# Patient Record
Sex: MALE | Race: BLACK OR AFRICAN AMERICAN
[De-identification: names, ages, dates, MRNs, and addresses within clinical notes are randomized per-mention and may not be internally consistent; named-entity substitution may affect disease eponyms.]

---

## 2019-01-10 ENCOUNTER — HOSPITAL ENCOUNTER (EMERGENCY)
Dept: HOSPITAL 87 - ER | Age: 54
LOS: 1 days | Discharge: HOME | End: 2019-01-11
Payer: MEDICARE

## 2019-01-10 VITALS — WEIGHT: 163.14 LBS | HEIGHT: 67 IN | BODY MASS INDEX: 25.61 KG/M2

## 2019-01-10 DIAGNOSIS — R44.0: ICD-10-CM

## 2019-01-10 DIAGNOSIS — R45.851: ICD-10-CM

## 2019-01-10 DIAGNOSIS — R07.89: ICD-10-CM

## 2019-01-10 DIAGNOSIS — F20.0: Primary | ICD-10-CM

## 2019-01-10 DIAGNOSIS — F31.9: ICD-10-CM

## 2019-01-10 LAB
AMPHETAMINES UR QL SCN: (no result)
APPEARANCE UR: CLEAR
BARBITURATES UR QL SCN: NEGATIVE
BASOPHILS NFR BLD AUTO: 0.4 % (ref 0–2)
BENZODIAZ UR QL SCN: NEGATIVE
BZE UR QL SCN: NEGATIVE
CANNABINOIDS UR QL SCN: NEGATIVE
CHLORIDE SERPL-SCNC: 100 MEQ/L (ref 98–107)
COLOR UR: YELLOW
EOSINOPHIL NFR BLD AUTO: 2.4 % (ref 0–5)
ERYTHROCYTE [DISTWIDTH] IN BLOOD BY AUTOMATED COUNT: 14.7 % (ref 11.6–14.6)
ETHANOL SERPL-MCNC: < 10 MG/DL
HCT VFR BLD AUTO: 45 % (ref 42–52)
HGB BLD-MCNC: 15.4 G/DL (ref 14–18)
HGB UR QL STRIP: NEGATIVE
KETONES UR STRIP-MCNC: (no result) MG/DL
LEUKOCYTE ESTERASE UR QL STRIP: NEGATIVE
LYMPHOCYTES NFR BLD AUTO: 26.2 % (ref 20–50)
MCH RBC QN AUTO: 30.5 PG (ref 28–32)
MCV RBC AUTO: 88.8 FL (ref 80–94)
METHADONE UR QL SCN: NEGATIVE
MONOCYTES NFR BLD AUTO: 9.5 % (ref 2–8)
NEUTROPHILS NFR BLD AUTO: 61.5 % (ref 40–76)
NITRITE UR QL STRIP: NEGATIVE
OPIATES UR QL SCN: NEGATIVE
PCP UR QL SCN: NEGATIVE
PH UR STRIP: 6 [PH] (ref 4.5–8)
PLATELET # BLD AUTO: 248 X1000/UL (ref 130–400)
PMV BLD AUTO: 8.6 FL (ref 7.4–10.4)
PROT UR QL STRIP: NEGATIVE
RBC # BLD AUTO: 5.06 MILL/UL (ref 4.7–6.1)
SP GR UR STRIP: 1.02 (ref 1–1.03)
UROBILINOGEN UR STRIP-MCNC: 2 E.U./DL (ref 0.2–1)

## 2019-01-10 PROCEDURE — 80305 DRUG TEST PRSMV DIR OPT OBS: CPT

## 2019-01-10 PROCEDURE — 80048 BASIC METABOLIC PNL TOTAL CA: CPT

## 2019-01-10 PROCEDURE — 84484 ASSAY OF TROPONIN QUANT: CPT

## 2019-01-10 PROCEDURE — 99284 EMERGENCY DEPT VISIT MOD MDM: CPT

## 2019-01-10 PROCEDURE — 85025 COMPLETE CBC W/AUTO DIFF WBC: CPT

## 2019-01-10 PROCEDURE — 36415 COLL VENOUS BLD VENIPUNCTURE: CPT

## 2019-01-10 PROCEDURE — 81003 URINALYSIS AUTO W/O SCOPE: CPT

## 2019-01-10 PROCEDURE — 80307 DRUG TEST PRSMV CHEM ANLYZR: CPT

## 2019-01-10 PROCEDURE — 93005 ELECTROCARDIOGRAM TRACING: CPT

## 2019-01-10 PROCEDURE — 80329 ANALGESICS NON-OPIOID 1 OR 2: CPT

## 2019-01-11 VITALS — SYSTOLIC BLOOD PRESSURE: 126 MMHG | DIASTOLIC BLOOD PRESSURE: 66 MMHG

## 2019-07-15 ENCOUNTER — HOSPITAL ENCOUNTER (INPATIENT)
Dept: HOSPITAL 54 - ER | Age: 54
LOS: 7 days | Discharge: NURSING, REHAB OR LONG-TERM CARE | DRG: 885 | End: 2019-07-22
Attending: INTERNAL MEDICINE | Admitting: PSYCHIATRY & NEUROLOGY
Payer: MEDICARE

## 2019-07-15 VITALS — BODY MASS INDEX: 20.53 KG/M2 | WEIGHT: 160 LBS | HEIGHT: 74 IN

## 2019-07-15 DIAGNOSIS — Z73.6: ICD-10-CM

## 2019-07-15 DIAGNOSIS — F15.10: ICD-10-CM

## 2019-07-15 DIAGNOSIS — Z79.899: ICD-10-CM

## 2019-07-15 DIAGNOSIS — F32.9: ICD-10-CM

## 2019-07-15 DIAGNOSIS — F41.9: ICD-10-CM

## 2019-07-15 DIAGNOSIS — K59.00: ICD-10-CM

## 2019-07-15 DIAGNOSIS — F20.0: Primary | ICD-10-CM

## 2019-07-15 DIAGNOSIS — F29: ICD-10-CM

## 2019-07-15 DIAGNOSIS — F19.90: ICD-10-CM

## 2019-07-15 LAB
ALBUMIN SERPL BCP-MCNC: 3.7 G/DL (ref 3.4–5)
ALP SERPL-CCNC: 53 U/L (ref 46–116)
ALT SERPL W P-5'-P-CCNC: 23 U/L (ref 12–78)
APAP SERPL-MCNC: 0 UG/ML (ref 10–30)
AST SERPL W P-5'-P-CCNC: 18 U/L (ref 15–37)
BASOPHILS # BLD AUTO: 0 /CMM (ref 0–0.2)
BASOPHILS NFR BLD AUTO: 0.6 % (ref 0–2)
BILIRUB DIRECT SERPL-MCNC: 0.1 MG/DL (ref 0–0.2)
BILIRUB SERPL-MCNC: 0.5 MG/DL (ref 0.2–1)
BUN SERPL-MCNC: 12 MG/DL (ref 7–18)
CALCIUM SERPL-MCNC: 8.8 MG/DL (ref 8.5–10.1)
CHLORIDE SERPL-SCNC: 104 MMOL/L (ref 98–107)
CO2 SERPL-SCNC: 27 MMOL/L (ref 21–32)
CREAT SERPL-MCNC: 1.1 MG/DL (ref 0.6–1.3)
EOSINOPHIL NFR BLD AUTO: 2 % (ref 0–6)
ETHANOL SERPL-MCNC: < 3 MG/DL (ref 0–0)
GLUCOSE SERPL-MCNC: 99 MG/DL (ref 74–106)
HCT VFR BLD AUTO: 48 % (ref 39–51)
HGB BLD-MCNC: 16.2 G/DL (ref 13.5–17.5)
LYMPHOCYTES NFR BLD AUTO: 1.7 /CMM (ref 0.8–4.8)
LYMPHOCYTES NFR BLD AUTO: 30.9 % (ref 20–44)
MCHC RBC AUTO-ENTMCNC: 34 G/DL (ref 31–36)
MCV RBC AUTO: 90 FL (ref 80–96)
MONOCYTES NFR BLD AUTO: 0.5 /CMM (ref 0.1–1.3)
MONOCYTES NFR BLD AUTO: 8.8 % (ref 2–12)
NEUTROPHILS # BLD AUTO: 3.2 /CMM (ref 1.8–8.9)
NEUTROPHILS NFR BLD AUTO: 57.7 % (ref 43–81)
PH UR STRIP: 6 [PH] (ref 5–8)
PLATELET # BLD AUTO: 252 /CMM (ref 150–450)
POTASSIUM SERPL-SCNC: 4.2 MMOL/L (ref 3.5–5.1)
PROT SERPL-MCNC: 7.4 G/DL (ref 6.4–8.2)
PROT UR QL STRIP: 30 MG/DL
RBC # BLD AUTO: 5.33 MIL/UL (ref 4.5–6)
RBC #/AREA URNS HPF: (no result) /HPF (ref 0–2)
SALICYLATES SERPL-MCNC: 2.2 MG/DL (ref 2.8–20)
SODIUM SERPL-SCNC: 139 MMOL/L (ref 136–145)
UROBILINOGEN UR STRIP-MCNC: 1 EU/DL
WBC #/AREA URNS HPF: (no result) /HPF (ref 0–3)
WBC NRBC COR # BLD AUTO: 5.5 K/UL (ref 4.3–11)

## 2019-07-15 PROCEDURE — G0480 DRUG TEST DEF 1-7 CLASSES: HCPCS

## 2019-07-15 RX ADMIN — LORAZEPAM PRN MG: 0.5 TABLET ORAL at 17:27

## 2019-07-15 RX ADMIN — TEMAZEPAM PRN MG: 7.5 CAPSULE ORAL at 21:40

## 2019-07-15 NOTE — NUR
RN-CO: UPON FACE TO FACE EVALUATION, PATIENT NOTED TO BE UNPREDICTABLE, ANGRY, 

UNCOOPERATIVE AND SUSPICIOUS TO STAFF. HIS SPEECH IS PRESSURED, HE IS 

MALODOROUS AND DISHEVELED.PT ALSO NOTED TO HAVE DISORGANIZED THOUGHTS.HE ALSO STATED THAT HE 
HAS AUDITORY HALLUCINATIONS. DR HOOKER IS AWARE OF THE ADMISSION AND GAVE HER ADMITTING 
ORDERS. ALL BELONGINGS WERE SCREENED OF CONTRABAND, VALUABLES WERE TAKEN TO SAFE. PATIENT'S 
RIGHTS WERE DISCUSSED AND BOOKLET WAS GIVEN. HE REFUSED SKIN ASSESSMENT AND NOTIFICATION OF 
RESPONSIBLE PARTY.

## 2019-07-15 NOTE — NUR
GPS RN NOTE, RECEIVED PATIENT AWAKE AND IN BED, NO S/S OR COMPLAINTS OF PAIN AT THIS TIME.  
PATIENT IS DISPLAYING NO S/S OF APPARENT DISTRESS AT THIS TIME.  PATIENT BREATHING IS 
UNLABORED WITH EQUAL RISE AND FALL OF THE CHEST.  PATIENT IS ALERT AND ORIENTED X 3 ON ROOM 
AIR WITH A SPO2 OF 99 %.  PATIENT IS MED COMPLAINT, DISORGANIZED, ANXIOUS, HYPERVERBAL, 
ANGRY, COOPERATIVE, AND NEEDS REORIENTATION.  PATIENT DENIES SUICIDE AND HOMICIDAL IDEATIONS 
AT THIS TIME.  PATIENT ASSISTED WITH TURNING AND REPOSITIONING Q2HR AND PRN FOR COMFORT AND 
CIRCULATION.  PATIENT HAS NO NEEDS AT THIS TIME.  PATIENT EDUCATED ON THE USE OF THE CALL 
BELL.  PATIENT BED SIDE RAILS ARE UP X 2 FOR SAFETY, BED IS LOCKED, AND LOW WILL CONTINUE TO 
MONITOR AND MAINTAIN SAFETY.

## 2019-07-15 NOTE — NUR
GPS RN NOTE, PATIENT HAS A COMPLAINT OF NOT BEING ABLE TO SLEEP AND IS REQUESTING RESTORIL 
AT THIS TIME.  PATIENT VITAL SIGNS ARE STABLE.  GAVE RESTORIL 7.5 MG PO HS PRN AS ORDERED.  
WILL REASSESS FOR INSOMNIA AND I WILL CONTINUE TO MONITOR THIS PATIENT.

## 2019-07-15 NOTE — NUR
GPS RN NOTE, PATIENT HAD A WITNESSED FALL IN HALLWAY NEAR NURSING STATION.  PATIENT FELL ON 
HIS LEFT HIP AND DID NOT HIT HIS HEAD AND HAD NO LOSS OF CONSCIOUSNESS.  PATIENT ASSISTED UP 
WITH THE HELP OF TWO STAFF MEMBERS.  PATIENT ABLE TO AMBULATE BACK TO BED IN ROOM 216-1 BUT 
HAS A COMPLAINT OF LEFT HIP PAIN 8 OUT OF 10 ON THE PAIN SCALE.  PATIENT VITAL SIGNS ARE AS 
FOLLOWS B//79, PULSE 72, TEMP 98.3, RESPIRATIONS 18, SPO2 99% ON ROOM AIR.  PAGED [facility] 
[employer] AND INFORMED DR LAUREN ZEE OF MY FINDINGS.   DR LAUREN ZEE ORDER 
TO DO LEFT HIP X-RAY 2 VIEWS STAT AND TO GIVE NORCO 5-325 1 TAB PO Q6 HR PRN.  HOUSE 
SUPERVISOR MADE AWARE OF FALL.  PATIENT HAS NO FAMILY TO NOTIFY OF FALL.  ALL ORDERS NOTED 
AND CARRIED OUT.  WILL CONTINUE TO MONITOR THIS PATIENT.

## 2019-07-15 NOTE — NUR
PATIENT REMAINS STABLE IN BED WATCHING T.V WITH NO DISTRESS NOTED. LUNCH WAS 
PROVIDED FOR PATIENT. WILL CONTINUE TO [facility]. AWAITING TO BE SEEN BY ART FOR 
PSYCH EVAL

## 2019-07-15 NOTE — NUR
53 YEAR OLD MALE BIBA [facility]  [address] from [facility] psych 
evaluation "showed agression this am during breakfast to another client". ALERT 
AND ORIENTED X 2-3 BREATHING EVEN AND UNLABORED WITH NO DISTRESS NOTED. SKIN 
WARM TO TOUCH AND INTACT. AWAITNG TO BE SEEN BY MD

## 2019-07-16 VITALS — SYSTOLIC BLOOD PRESSURE: 126 MMHG | DIASTOLIC BLOOD PRESSURE: 76 MMHG

## 2019-07-16 VITALS — DIASTOLIC BLOOD PRESSURE: 65 MMHG | SYSTOLIC BLOOD PRESSURE: 115 MMHG

## 2019-07-16 LAB
ALBUMIN SERPL BCP-MCNC: 3.4 G/DL (ref 3.4–5)
ALP SERPL-CCNC: 42 U/L (ref 46–116)
ALT SERPL W P-5'-P-CCNC: 22 U/L (ref 12–78)
AST SERPL W P-5'-P-CCNC: 16 U/L (ref 15–37)
BILIRUB SERPL-MCNC: 0.6 MG/DL (ref 0.2–1)
BUN SERPL-MCNC: 14 MG/DL (ref 7–18)
CALCIUM SERPL-MCNC: 8.7 MG/DL (ref 8.5–10.1)
CHLORIDE SERPL-SCNC: 104 MMOL/L (ref 98–107)
CHOLEST SERPL-MCNC: 115 MG/DL (ref ?–200)
CO2 SERPL-SCNC: 27 MMOL/L (ref 21–32)
CREAT SERPL-MCNC: 1 MG/DL (ref 0.6–1.3)
GLUCOSE SERPL-MCNC: 82 MG/DL (ref 74–106)
HDLC SERPL-MCNC: 36 MG/DL (ref 40–60)
LDLC SERPL DIRECT ASSAY-MCNC: 66 MG/DL (ref 0–99)
POTASSIUM SERPL-SCNC: 3.9 MMOL/L (ref 3.5–5.1)
PROT SERPL-MCNC: 6.9 G/DL (ref 6.4–8.2)
SODIUM SERPL-SCNC: 138 MMOL/L (ref 136–145)
TRIGL SERPL-MCNC: 69 MG/DL (ref 30–150)

## 2019-07-16 RX ADMIN — Medication SCH MG: at 17:08

## 2019-07-16 RX ADMIN — TEMAZEPAM PRN MG: 7.5 CAPSULE ORAL at 22:03

## 2019-07-16 RX ADMIN — LORAZEPAM PRN MG: 0.5 TABLET ORAL at 08:38

## 2019-07-16 NOTE — NUR
GPS RN NOTE, DR LAUREN ZEE MADE AWARE OF PATIENT LEFT HIP X-RAY RESULTS. WILL 
CONTINUE TO MONITOR THIS PATIENT.

## 2019-07-17 VITALS — DIASTOLIC BLOOD PRESSURE: 76 MMHG | SYSTOLIC BLOOD PRESSURE: 128 MMHG

## 2019-07-17 VITALS — SYSTOLIC BLOOD PRESSURE: 117 MMHG | DIASTOLIC BLOOD PRESSURE: 67 MMHG

## 2019-07-17 VITALS — DIASTOLIC BLOOD PRESSURE: 59 MMHG | SYSTOLIC BLOOD PRESSURE: 105 MMHG

## 2019-07-17 RX ADMIN — LORAZEPAM PRN MG: 0.5 TABLET ORAL at 08:25

## 2019-07-17 RX ADMIN — TEMAZEPAM PRN MG: 7.5 CAPSULE ORAL at 22:13

## 2019-07-17 RX ADMIN — BENZTROPINE MESYLATE SCH MG: 1 TABLET ORAL at 16:51

## 2019-07-17 RX ADMIN — Medication SCH MG: at 16:50

## 2019-07-17 RX ADMIN — QUETIAPINE SCH MG: 25 TABLET, FILM COATED ORAL at 16:51

## 2019-07-17 RX ADMIN — Medication SCH MG: at 08:25

## 2019-07-17 NOTE — NUR
NURSE NOTE

PT IS VERY SEXUAL FOCUSED OPENING UP HIS HOSPITAL GROWN, PT INSTRUCTED TO COVER HIS CLOTHS, 
AND INSTRUCTED NOT TO EXPOSED HIS HIM SELF AGAIN. PT TOOK HIS MEDICATION AND STARTED 
COMPLAINING OF HAVING SIDE EFFECT OF THE MEDICATION, DR WERE CALLED AND SHE GAVE ORDER FOR 
BENADYL. IM SHOT WERE GIVEN.

## 2019-07-17 NOTE — NUR
Initial Discharge Plan: Pt currently resides at [facility] located at [address] [place] [address]; ([phone]). Per pt, he would like to return 
to the facility. SW will work with the MD and the pt regarding appropriate discharge 
planning. SW will form a safe and proper discharge.

## 2019-07-17 NOTE — NUR
NURSE NOTE 

DR CHANGE PT MEDICATION RISPERDAL TO SEROQUEL, PT STILL FOCUSED ON THE RISPERDAL ASKING WHY 
DR GIVE HIM  RISPERDAL IN FIRST PLACE, PT ASKING REPEATED QUESTIONS OVER AND OVER, PT 
INSTRUCTED TO WRITE DOWN ALL HIS QUESTIONS AND ASKED DR TOMORROW.

## 2019-07-18 VITALS — SYSTOLIC BLOOD PRESSURE: 110 MMHG | DIASTOLIC BLOOD PRESSURE: 66 MMHG

## 2019-07-18 VITALS — SYSTOLIC BLOOD PRESSURE: 100 MMHG | DIASTOLIC BLOOD PRESSURE: 59 MMHG

## 2019-07-18 VITALS — DIASTOLIC BLOOD PRESSURE: 69 MMHG | SYSTOLIC BLOOD PRESSURE: 106 MMHG

## 2019-07-18 RX ADMIN — MUPIROCIN SCH GM: 20 OINTMENT TOPICAL at 21:07

## 2019-07-18 RX ADMIN — LORAZEPAM PRN MG: 0.5 TABLET ORAL at 08:43

## 2019-07-18 RX ADMIN — QUETIAPINE SCH MG: 25 TABLET, FILM COATED ORAL at 08:43

## 2019-07-18 RX ADMIN — QUETIAPINE SCH MG: 25 TABLET, FILM COATED ORAL at 17:27

## 2019-07-18 RX ADMIN — Medication SCH MG: at 17:27

## 2019-07-18 RX ADMIN — BENZTROPINE MESYLATE SCH MG: 1 TABLET ORAL at 08:43

## 2019-07-18 RX ADMIN — TEMAZEPAM PRN MG: 7.5 CAPSULE ORAL at 21:07

## 2019-07-18 RX ADMIN — BENZTROPINE MESYLATE SCH MG: 1 TABLET ORAL at 17:27

## 2019-07-18 RX ADMIN — Medication SCH MG: at 08:43

## 2019-07-18 NOTE — NUR
NURSE NOTE 

LAB CALLED THAT PT HAS MRSA OF NARES, PT ROOM CHARGE FROM 216B , PT INSTRUCTED TO STAY 
IN HIS ROOM BECAUESE OF THE ISOLATION

## 2019-07-18 NOTE — NUR
Group Note: Pt refused to attend group on 7/18/19 at 2pm and stated that he would much 
rather "roam the halls."

## 2019-07-19 VITALS — SYSTOLIC BLOOD PRESSURE: 112 MMHG | DIASTOLIC BLOOD PRESSURE: 75 MMHG

## 2019-07-19 VITALS — SYSTOLIC BLOOD PRESSURE: 145 MMHG | DIASTOLIC BLOOD PRESSURE: 94 MMHG

## 2019-07-19 VITALS — DIASTOLIC BLOOD PRESSURE: 79 MMHG | SYSTOLIC BLOOD PRESSURE: 125 MMHG

## 2019-07-19 RX ADMIN — LORAZEPAM PRN MG: 0.5 TABLET ORAL at 08:55

## 2019-07-19 RX ADMIN — QUETIAPINE SCH MG: 25 TABLET, FILM COATED ORAL at 17:41

## 2019-07-19 RX ADMIN — MUPIROCIN SCH GM: 20 OINTMENT TOPICAL at 21:24

## 2019-07-19 RX ADMIN — QUETIAPINE SCH MG: 25 TABLET, FILM COATED ORAL at 08:55

## 2019-07-19 RX ADMIN — Medication SCH MG: at 08:54

## 2019-07-19 RX ADMIN — BENZTROPINE MESYLATE SCH MG: 1 TABLET ORAL at 08:55

## 2019-07-19 RX ADMIN — Medication SCH MG: at 17:40

## 2019-07-19 RX ADMIN — QUETIAPINE FUMARATE SCH MG: 100 TABLET ORAL at 21:24

## 2019-07-19 RX ADMIN — MUPIROCIN SCH GM: 20 OINTMENT TOPICAL at 08:55

## 2019-07-19 NOTE — NUR
SW informed the pt that he would be discharged back to [facility] on Monday 
if he is medically and psychiatrically cleared. Pt stated that he wants to return there as 
soon as possible.

## 2019-07-19 NOTE — NUR
NURSE NOTE 

PT REFUSED MEDICATION STATED I DONT WANT TO TAKE IT, I DONT FEEL LIKE TALKING MEDICATION 
TODAY, PT ENCOURAGED TO TAKE MEDS STILL REFUSED,, BOTH MEDICAL AND PSYCH  DOCTORS NOTIFIED.

## 2019-07-19 NOTE — NUR
Group Note: Pt refused to attend group on 7/19/19 at 2pm and stated that he was going to be 
discharged soon and so group is not necessary.

## 2019-07-19 NOTE — NUR
GPS RN NOTES



PT AGITATED TOWARDS STAFF. REDIRECTED PT TO HIS ROOM. PT COMPLIANT. WILL MONITOR PT BEHAVIOR 
CLOSELY.

## 2019-07-20 VITALS — DIASTOLIC BLOOD PRESSURE: 67 MMHG | SYSTOLIC BLOOD PRESSURE: 107 MMHG

## 2019-07-20 VITALS — SYSTOLIC BLOOD PRESSURE: 126 MMHG | DIASTOLIC BLOOD PRESSURE: 78 MMHG

## 2019-07-20 VITALS — DIASTOLIC BLOOD PRESSURE: 73 MMHG | SYSTOLIC BLOOD PRESSURE: 111 MMHG

## 2019-07-20 RX ADMIN — Medication SCH MG: at 09:32

## 2019-07-20 RX ADMIN — QUETIAPINE FUMARATE SCH MG: 100 TABLET ORAL at 21:36

## 2019-07-20 RX ADMIN — Medication SCH MG: at 17:37

## 2019-07-20 RX ADMIN — MUPIROCIN SCH GM: 20 OINTMENT TOPICAL at 09:32

## 2019-07-20 RX ADMIN — QUETIAPINE SCH MG: 25 TABLET, FILM COATED ORAL at 17:37

## 2019-07-20 RX ADMIN — MUPIROCIN SCH APPLIC: 20 OINTMENT TOPICAL at 21:53

## 2019-07-20 RX ADMIN — QUETIAPINE SCH MG: 25 TABLET, FILM COATED ORAL at 09:32

## 2019-07-21 VITALS — DIASTOLIC BLOOD PRESSURE: 58 MMHG | SYSTOLIC BLOOD PRESSURE: 100 MMHG

## 2019-07-21 VITALS — DIASTOLIC BLOOD PRESSURE: 81 MMHG | SYSTOLIC BLOOD PRESSURE: 128 MMHG

## 2019-07-21 VITALS — DIASTOLIC BLOOD PRESSURE: 87 MMHG | SYSTOLIC BLOOD PRESSURE: 133 MMHG

## 2019-07-21 RX ADMIN — MUPIROCIN SCH APPLIC: 20 OINTMENT TOPICAL at 21:38

## 2019-07-21 RX ADMIN — QUETIAPINE FUMARATE SCH MG: 100 TABLET ORAL at 21:38

## 2019-07-21 RX ADMIN — MUPIROCIN SCH APPLIC: 20 OINTMENT TOPICAL at 08:49

## 2019-07-21 RX ADMIN — TEMAZEPAM PRN MG: 7.5 CAPSULE ORAL at 23:12

## 2019-07-21 RX ADMIN — Medication SCH MG: at 17:12

## 2019-07-21 RX ADMIN — QUETIAPINE SCH MG: 25 TABLET, FILM COATED ORAL at 08:50

## 2019-07-21 RX ADMIN — QUETIAPINE SCH MG: 25 TABLET, FILM COATED ORAL at 17:12

## 2019-07-21 RX ADMIN — Medication SCH MG: at 08:50

## 2019-07-22 VITALS — SYSTOLIC BLOOD PRESSURE: 115 MMHG | DIASTOLIC BLOOD PRESSURE: 76 MMHG

## 2019-07-22 RX ADMIN — QUETIAPINE SCH MG: 25 TABLET, FILM COATED ORAL at 08:55

## 2019-07-22 RX ADMIN — LORAZEPAM PRN MG: 0.5 TABLET ORAL at 14:01

## 2019-07-22 RX ADMIN — Medication SCH MG: at 08:55

## 2019-07-22 RX ADMIN — MUPIROCIN SCH APPLIC: 20 OINTMENT TOPICAL at 09:03

## 2019-07-22 NOTE — NUR
RN-CO: PATIENT REMAINS COOPERATIVE TO CARE, DENIED SUICIDAL AND HOMICIDAL IDEATION, DENIED 
AUDITORY AND VISUAL HALLUCINATION. MEDICALLY CLEARED BY DR TRANG REYES. DR HOOKER 
ORDERED TO  DISCONTINUE HOLD AND DISCHARGE PATIENT TODAY. ALL BELONGINGS WILL BE GIVEN BACK 
TO THE PATIENT. DEIRDRE REYNOSO EXPLAINED THE DISCHARGE PAPERS AND INSTRUCTIONS AND HE VERBALIZED 
UNDERSTANDING.

## 2019-07-22 NOTE — NUR
Discharge Note: Pt was discharged to [facility] ([facility]) located at [address] 
[place] [address] ([phone]). Pt was transported via [facility] at 
2PM. Upon discharge, the pt appeared to be in a euthymic mood and presented with a calm 
affect. Pt denied both suicidal and homicidal ideation as well as auditory and visual 
hallucinations. Pt was provided with substance abuse and smoking cessation referrals. Pt 
will be under the care of his psychiatrist, Dr. Gaxiola, located at [address] [address], [place] [address]; [phone] and her internist, Dr. Cristina Duran, located at [address] [place] [address]; [phone].

## 2019-07-22 NOTE — NUR
NURSE NOTE

CALLED FOR REPORT TO IRMA MCKEON, GAVE REPORT TO RONI THE CHARGE NURSE, PSYCHIATRIST AND 
MEDICAL DOCTOR DISCHARGE PT BACK TO FACILITY, PT IN STABLE CONDITION DENIED ANY PAIN OR 
DISCOMFORT AT THIS TIME, DENIED ANY SI. PT PICKED UP BY AMBULANCE, ALL BELONGINGS GIVEN TO 
PATIENT.

## 2019-07-22 NOTE — NUR
ANA CRISTINA faxed updated clinical paperwork to [facility] with attention to Lew 
to the fax number: [phone].

## 2020-02-12 ENCOUNTER — HOSPITAL ENCOUNTER (INPATIENT)
Dept: HOSPITAL 26 - MED | Age: 55
LOS: 5 days | Discharge: LEFT BEFORE DISCHARGE | DRG: 917 | End: 2020-02-17
Attending: GENERAL PRACTICE | Admitting: GENERAL PRACTICE
Payer: COMMERCIAL

## 2020-02-12 VITALS — SYSTOLIC BLOOD PRESSURE: 120 MMHG | DIASTOLIC BLOOD PRESSURE: 72 MMHG

## 2020-02-12 VITALS — DIASTOLIC BLOOD PRESSURE: 87 MMHG | SYSTOLIC BLOOD PRESSURE: 135 MMHG

## 2020-02-12 VITALS — SYSTOLIC BLOOD PRESSURE: 122 MMHG | DIASTOLIC BLOOD PRESSURE: 78 MMHG

## 2020-02-12 VITALS — WEIGHT: 178 LBS | HEIGHT: 71 IN | BODY MASS INDEX: 24.92 KG/M2

## 2020-02-12 DIAGNOSIS — F17.210: ICD-10-CM

## 2020-02-12 DIAGNOSIS — K21.9: ICD-10-CM

## 2020-02-12 DIAGNOSIS — G92: ICD-10-CM

## 2020-02-12 DIAGNOSIS — J44.9: ICD-10-CM

## 2020-02-12 DIAGNOSIS — T43.591A: Primary | ICD-10-CM

## 2020-02-12 DIAGNOSIS — Z59.0: ICD-10-CM

## 2020-02-12 DIAGNOSIS — N40.0: ICD-10-CM

## 2020-02-12 DIAGNOSIS — G40.909: ICD-10-CM

## 2020-02-12 DIAGNOSIS — F31.9: ICD-10-CM

## 2020-02-12 DIAGNOSIS — G89.4: ICD-10-CM
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
## 2020-02-12 DIAGNOSIS — R45.850: ICD-10-CM

## 2020-02-12 DIAGNOSIS — L60.0: ICD-10-CM

## 2020-02-12 DIAGNOSIS — F20.9: ICD-10-CM

## 2020-02-12 DIAGNOSIS — F41.9: ICD-10-CM

## 2020-02-12 LAB
ALBUMIN FLD-MCNC: 3.4 G/DL (ref 3.4–5)
AMYLASE SERPL-CCNC: 55 U/L (ref 25–115)
ANION GAP SERPL CALCULATED.3IONS-SCNC: 7.5 MMOL/L (ref 8–16)
AST SERPL-CCNC: 21 U/L (ref 15–37)
BASOPHILS # BLD AUTO: 0 K/UL (ref 0–0.22)
BASOPHILS NFR BLD AUTO: 0.6 % (ref 0–2)
BILIRUB SERPL-MCNC: 0.7 MG/DL (ref 0–1)
BUN SERPL-MCNC: 10 MG/DL (ref 7–18)
CHLORIDE SERPL-SCNC: 104 MMOL/L (ref 98–107)
CHOLEST/HDLC SERPL: 3.2 {RATIO} (ref 1–4.5)
CO2 SERPL-SCNC: 31.4 MMOL/L (ref 21–32)
CREAT SERPL-MCNC: 1 MG/DL (ref 0.6–1.3)
EOSINOPHIL # BLD AUTO: 0.3 K/UL (ref 0–0.4)
EOSINOPHIL NFR BLD AUTO: 4.9 % (ref 0–4)
ERYTHROCYTE [DISTWIDTH] IN BLOOD BY AUTOMATED COUNT: 14.7 % (ref 11.6–13.7)
GFR SERPL CREATININE-BSD FRML MDRD: 100 ML/MIN (ref 90–?)
GLUCOSE SERPL-MCNC: 75 MG/DL (ref 74–106)
HCT VFR BLD AUTO: 44.2 % (ref 36–52)
HDLC SERPL-MCNC: 53 MG/DL (ref 40–60)
HGB BLD-MCNC: 14.4 G/DL (ref 12–18)
LDLC SERPL CALC-MCNC: 95 MG/DL (ref 60–100)
LIPASE SERPL-CCNC: 136 U/L (ref 73–393)
LYMPHOCYTES # BLD AUTO: 1.3 K/UL (ref 2–11.5)
LYMPHOCYTES NFR BLD AUTO: 25.6 % (ref 20.5–51.1)
MAGNESIUM SERPL-MCNC: 1.9 MG/DL (ref 1.8–2.4)
MCH RBC QN AUTO: 29 PG (ref 27–31)
MCHC RBC AUTO-ENTMCNC: 33 G/DL (ref 33–37)
MCV RBC AUTO: 87.5 FL (ref 80–94)
MONOCYTES # BLD AUTO: 0.6 K/UL (ref 0.8–1)
MONOCYTES NFR BLD AUTO: 11.1 % (ref 1.7–9.3)
NEUTROPHILS # BLD AUTO: 2.9 K/UL (ref 1.8–7.7)
NEUTROPHILS NFR BLD AUTO: 57.8 % (ref 42.2–75.2)
PHOSPHATE SERPL-MCNC: 3 MG/DL (ref 2.5–4.9)
PLATELET # BLD AUTO: 256 K/UL (ref 140–450)
POTASSIUM SERPL-SCNC: 3.9 MMOL/L (ref 3.5–5.1)
PROTHROMBIN TIME: 10.2 SECS (ref 10.8–13.4)
RBC # BLD AUTO: 5.05 MIL/UL (ref 4.2–6.1)
SODIUM SERPL-SCNC: 139 MMOL/L (ref 136–145)
T4 FREE SERPL-MCNC: 1.2 NG/DL (ref 0.76–1.46)
TRIGL SERPL-MCNC: 115 MG/DL (ref 30–150)
TSH SERPL DL<=0.05 MIU/L-ACNC: 1.24 UIU/ML (ref 0.34–3.74)
WBC # BLD AUTO: 5.1 K/UL (ref 4.8–10.8)

## 2020-02-12 RX ADMIN — SODIUM CHLORIDE SCH MLS/HR: 9 INJECTION, SOLUTION INTRAVENOUS at 14:53

## 2020-02-12 RX ADMIN — DIVALPROEX SODIUM SCH MG: 500 TABLET, DELAYED RELEASE ORAL at 20:52

## 2020-02-12 SDOH — ECONOMIC STABILITY - HOUSING INSECURITY: HOMELESSNESS: Z59.0

## 2020-02-12 NOTE — NUR
Patient will be admitted to care of . Admited to MS.  Will go to room 125B. 
Belongings list completed.  Report to JOE NUÑEZ.

## 2020-02-12 NOTE — NUR
55 Y/O M BIBA FROM HOME DUE TO A SIDE EFFECT REACTION TO RX [facility]. PER EMS 
HAVING DISTONIA GIVEN BENADRYL IN THE FIELD AND PT IMPROVED. PT A/OX4. NOT 
COOPERATIVE WITH HX AND FULL RX. SIDE RAIL X1.

## 2020-02-12 NOTE — NUR
Patient BIBA [facility] accompanied by Yani ESPINAL, transferred to bed 9. RN 
evaluating patient at bedside.

## 2020-02-12 NOTE — NUR
RECIEVED PT. AAOX4 , NID , IV SITE INTACT AND PATENT . DENIES PAIN . STILL FOR COLLECTION OF 
URINE - FOR URINE DRUG TEST - REMINDS PT. ON SAFETY / FALL PRECAUTION PROTOCOL - CALL LIGHT 
/ URINAL WITHIN REACH . PLAN OF CARE DISCUSSED AND VERBALIZE UNDERSTANDING. WILL CONT. TO 
MONITOR. ON S2 PRECAUTION PROTOCOL .

## 2020-02-12 NOTE — NUR
PT CAME TO UNIT ON WHEELCHAIR. PT AMBULATED FROM WHEEL CHAIR TO BED. ADMISSION ASSESSMENT 
DONE ON PATIENT. SKIN INTACT EXCEPT FOR AN OLD SURGERY SCAR ON LEFT CHEST. PT SAYS HE HAD 
PAST SUICIDAL IDEATION. NOW HE SAYS THAT PEOPLE ARE OUT TO KILL HIM.  PT IS HOMELESS. PT 
LIVES ON THE STREET AND ENQUIRED ABOUT RESOURCES. PT HAS A SON BUT DOES NOT STAY WITH HIM. 
PT'S BELONGINGS WITH PATIENT. PT'S MEDICATIONS WAS SEEN BY DR. DUVAL. IV INTACT. PAIN TO LEGS 
BUT TOLERABLE. NOTED WITH SWELLING TO INNER FEET. PT LAST TOOK ILLICIT DRUGS ON 02/11/202O. 
CHOICE OD DRUG WAS SPEED. PATIENT SMOKES 1 PACK PER DAY BUT REFUSED SMOKING CESSATION 
CLASSES. PT SAYS HE HAS A HISTORY OF BIPOLAR, SCHIZOPHRENIA, AND SUICIDAL IDEATION. BUT NOT 
CURRENT THOUGHTS TO HARM HIM. BED ALARM IN CHECK. PT IS ALERT AND AWAKE AND RESPONSIVE. PT 
HAD A SANDWICH AND JUICE TO DRINK. CALL LIGHT IN REACH.

## 2020-02-12 NOTE — NUR
PT IS IN BED AT THIS TIME. PT AMBULATED TO RESTROOM. NO COMPLAINS OF PAIN. NO DISTRESS 
NOTED. CALL LIGHT IN REACH.

## 2020-02-13 VITALS — SYSTOLIC BLOOD PRESSURE: 103 MMHG | DIASTOLIC BLOOD PRESSURE: 69 MMHG

## 2020-02-13 VITALS — SYSTOLIC BLOOD PRESSURE: 101 MMHG | DIASTOLIC BLOOD PRESSURE: 68 MMHG

## 2020-02-13 VITALS — SYSTOLIC BLOOD PRESSURE: 119 MMHG | DIASTOLIC BLOOD PRESSURE: 76 MMHG

## 2020-02-13 LAB
APPEARANCE UR: CLEAR
BARBITURATES UR QL SCN: (no result) NG/ML
BENZODIAZ UR QL SCN: (no result) NG/ML
BILIRUB UR QL STRIP: NEGATIVE
BZE UR QL SCN: (no result) NG/ML
CANNABINOIDS UR QL SCN: (no result) NG/ML
COLOR UR: YELLOW
GLUCOSE UR STRIP-MCNC: NEGATIVE MG/DL
HGB UR QL STRIP: NEGATIVE
LEUKOCYTE ESTERASE UR QL STRIP: NEGATIVE
NITRITE UR QL STRIP: NEGATIVE
OPIATES UR QL SCN: (no result) NG/ML
PCP UR QL SCN: (no result) NG/ML
PH UR STRIP: 6 [PH] (ref 5–9)
T3RU NFR SERPL: 32 % (ref 24–39)
T4 SERPL-MCNC: 5.1 UG/DL (ref 4.5–12)

## 2020-02-13 PROCEDURE — 0HBRXZZ EXCISION OF TOE NAIL, EXTERNAL APPROACH: ICD-10-PCS

## 2020-02-13 RX ADMIN — TAMSULOSIN HYDROCHLORIDE SCH MG: 0.4 CAPSULE ORAL at 09:20

## 2020-02-13 RX ADMIN — SODIUM CHLORIDE SCH MLS/HR: 9 INJECTION, SOLUTION INTRAVENOUS at 07:33

## 2020-02-13 RX ADMIN — IPRATROPIUM BROMIDE AND ALBUTEROL SULFATE SCH ML: .5; 3 SOLUTION RESPIRATORY (INHALATION) at 09:26

## 2020-02-13 RX ADMIN — NICOTINE SCH MG: 14 PATCH, EXTENDED RELEASE TOPICAL at 09:19

## 2020-02-13 RX ADMIN — DIVALPROEX SODIUM SCH MG: 500 TABLET, DELAYED RELEASE ORAL at 09:20

## 2020-02-13 RX ADMIN — DIVALPROEX SODIUM SCH MG: 500 TABLET, DELAYED RELEASE ORAL at 20:35

## 2020-02-13 RX ADMIN — IPRATROPIUM BROMIDE AND ALBUTEROL SULFATE SCH ML: .5; 3 SOLUTION RESPIRATORY (INHALATION) at 21:35

## 2020-02-13 NOTE — NUR
DISCHARGE PLANNING:



THIS IS A 54 Y/O MALE PATIENT FROM HOME, WHO CAME IN DUE TO LOCKED JAW, UNABLE TO MOVE AND 
JITTERY SENSATION X 1 DAY.  PAST MEDICAL HISTORY INCLUDE LIVER CIRRHOSIS, ETOH ABUSE, 
CHRONIC PAIN, COPD.  INITIAL DIAGNOSIS OF DYSTONIA.  CURRENT LABS INCLUDE WBC 5.1, H/H 
14.4/44.2, NA/K 139/3.9, BUN/CREA 10/1.0.  UDS SHOWED POSITIVE FOR AMPHETAMINES.  MRSA NARES 
PENDING.  ON SEROQUEL, DEPAKOTE.  CONSULTS INCLUDE NEURO, PODIATRY AND PSYCHE CONSULTS.  DC 
PLAN PENDING ON PATIENT'S RESPONSE TO TREATMENT.

-------------------------------------------------------------------------------

Addendum: 02/14/20 at 1559 by Kathryn Abbott CM

-------------------------------------------------------------------------------

0900:  RECEIVED AN ORDER FOR [facility] FOR PT.



MET WITH THE PATIENT AT THE BEDSIDE TO DISCUSS DC PLANNING AND IS IN AGREEMENT.  HE ALSO 
STATED HE DOES NOT HAVE ANY PREFERENCE.  HE SAID HIS PLAN IS TO GO BACK HOME WITH HIS SON 
AFTER SNF.  HE ALSO STATED THAT HE RECEIVES $800/MONTH FROM [employer], BUT IS NOT WILLING TO PAY 
FOR A BOARD AND CARE.  HE STATED THAT HIS SON WHO LIVES ON A SE[address] 8 HOUSING IS WILLING TO 
TAKE HIM BACK.



INQUIRY SENT TO [facility].



PER [facility], THEY WILL REVIEW THE REFERRAL



PER [facility], THEY WILL REVIEW THE REFERRAL.

-------------------------------------------------------------------------------

Addendum: 02/14/20 at 1608 by Kathryn Abbott CM

-------------------------------------------------------------------------------

RECEIVED A CALL FROM [facility] [phone], STATING THAT THEY ARE ABLE TO ACCEPT 
THE PATIENT AND WILL BE ABLE TO SET UP TRANSPORT AS WELL.  DR. BOONE AND CHARGE NURSE MADE 
AWARE.

## 2020-02-13 NOTE — NUR
LATE ENTRY.

PT HAD AT LEAST 5 SANDWICHES IN BETWEEN MEALS. CONSTANTLY ASKS FOR FOOD. IF NOT EATING, 
SLEEPING. NO MORE JITTERY, LOCKED JAW. ALL SX RESOLVED. IV NOT WORKING. REFUSED REINSERT. MD 
AWARE. MOST LIKELY D/C TOMORROW.

## 2020-02-13 NOTE — NUR
PT REFUSED VITAL SIGNS TAKING, RISK AND BENEFITS EXPLAINED, PT STILL REFUSING STATED "LEAVE 
ME ALONE" THEN COVERED HIS HEAD WITH BLANKET, NO SIGNS OF DISTRESS, CONTINUE TO MONITOR 
CLOSELY.

## 2020-02-13 NOTE — NUR
DUE MEDS ADMINISTERED WITH EDUCATION PROVIDED, PT PROVIDED WITH COFFEE PER REQUEST, ALL 
NEEDS ATTENDED.

## 2020-02-13 NOTE — NUR
PATIENT HAS BEEN SCREENED AND CATEGORIZED AS LOW NUTRITION RISK. PATIENT WILL BE SEEN WITHIN 
7 DAYS OF ADMISSION.



02/19/20



[address]

## 2020-02-13 NOTE — NUR
Note:



Per patient, he does not want to provide me with information for assessment/discharge plan.

## 2020-02-13 NOTE — NUR
RECEIVED REPORT FROM NIGHT SHIFT NURSE. PT IS AWAKE AND ORIENTED. INTRODUCED MYSELF AND 
UPDATED THE BOARD. V/S WITHIN NORMAL. IV ON L AC 20G, NS AT 60ML/HR. SKIN IS INTACT. DENIES 
PAIN. AMBULATORY. PER NIGHT SHIFT, PT REFUSED LABS, CT OF THE HEAD, THIS MORNING. WILL 
CONTINUE TO MONITOR PT.

## 2020-02-13 NOTE — NUR
RECEIVED PT ON BED, AAOX4, DENIES ANY PAIN, NO COMPLAINT OF JITTERY OR LOCKJAW, NO IV ACCESS 
AT THIS TIME, PT REFUSED IN INSERTION, 'S ARE AWARE, ON SEIZURE PRECAUTION WITH SIDE RAILS 
UP AND PADDED, SAFETY MEASURES IN PLACE, CALL LIGHT WITHIN REACH.

## 2020-02-13 NOTE — NUR
PT REFUSED SCHEDULED BREATHING TX. PT SATURATION WAS 97% ON RA. B/S WERE CLEAR.NO SOB NOTED. 
WILL CONT TO MONITOR

## 2020-02-13 NOTE — NUR
NOTICED PT'S IV SITE WAS BLEEDING. CHECKED ON THE DRESSING. CHANGED DRESSING BUT IV IS 
ALMOST OUT OF PLACE. PT REFUSED ANOTHER IV ACCESS. WILL NOTIFY MD.

## 2020-02-14 VITALS — SYSTOLIC BLOOD PRESSURE: 125 MMHG | DIASTOLIC BLOOD PRESSURE: 65 MMHG

## 2020-02-14 VITALS — SYSTOLIC BLOOD PRESSURE: 135 MMHG | DIASTOLIC BLOOD PRESSURE: 83 MMHG

## 2020-02-14 VITALS — DIASTOLIC BLOOD PRESSURE: 98 MMHG | SYSTOLIC BLOOD PRESSURE: 131 MMHG

## 2020-02-14 RX ADMIN — TAMSULOSIN HYDROCHLORIDE SCH MG: 0.4 CAPSULE ORAL at 08:43

## 2020-02-14 RX ADMIN — DIVALPROEX SODIUM SCH MG: 500 TABLET, DELAYED RELEASE ORAL at 21:34

## 2020-02-14 RX ADMIN — IPRATROPIUM BROMIDE AND ALBUTEROL SULFATE SCH ML: .5; 3 SOLUTION RESPIRATORY (INHALATION) at 21:00

## 2020-02-14 RX ADMIN — DIVALPROEX SODIUM SCH MG: 500 TABLET, DELAYED RELEASE ORAL at 08:43

## 2020-02-14 RX ADMIN — SODIUM CHLORIDE SCH MLS/HR: 9 INJECTION, SOLUTION INTRAVENOUS at 16:53

## 2020-02-14 RX ADMIN — NICOTINE SCH MG: 14 PATCH, EXTENDED RELEASE TOPICAL at 08:42

## 2020-02-14 RX ADMIN — IPRATROPIUM BROMIDE AND ALBUTEROL SULFATE SCH ML: .5; 3 SOLUTION RESPIRATORY (INHALATION) at 09:21

## 2020-02-14 RX ADMIN — SODIUM CHLORIDE SCH MLS/HR: 9 INJECTION, SOLUTION INTRAVENOUS at 00:13

## 2020-02-14 NOTE — NUR
PT. AWAKE AT THIS TIME. ALLOWED ME TO TAKE VITAL SIGNS AT THIS TIME. ABLE TO VERBALIZE WELL 
IN ENGLISH. EXPLAINED WHY I HAVE TO TAKE HIS VS PRIOR MEDICATIONS. AFTER EXPLAINING TO PT. 
AGREED TO HAVE VS TAKEN BY ME.  ASKED  IF HE IS HUNGRY AND HE SAID "NO" I HAD DINNER" 
ENCOURAGED TO CALL FOR ANY HELP HE MAY NEED. RE-ORIENTED TO CALL LIGHT USE.

## 2020-02-14 NOTE — NUR
PT REFUSED HHN TX. PT IN NO APPARENT RESPIRATORY DISTRESS AT THIS TIME; HR 66. RR 18, SPO2 
OF 99% ON ROOM AIR, AND CLEAR BREATH SOUNDS. WILL CONTINUE TO MONITOR PT.

## 2020-02-14 NOTE — NUR
RECEIVED BEDSIDE REPORT FROM NIGHT SHIFT NURSE SUSHIL. PT IS ASLEEP, NO S/S OF DISTRESS, ON 
ROOM AIR, SKIN INTACT. NO IV SITE, PT HAS REFUSED IV PLACEMENT. FALL PRECAUTIONS IN PLACE. 
SEIZURE PRECAUTIONS IN PLACE. CALL LIGHT IS WITHIN REACH. PER NIGHT NURSE, PT HAS BEEN 
REFUSING VITAL SIGN CHECKS AND ASSESSMENTS. WILL CONTINUE TO MONITOR.

## 2020-02-14 NOTE — NUR
GOT A CALL FROM BAKARI FROM [facility], WANTING TO SPEAK WITH THE PATIENT 
DIRECTLY REGARDING HIS RESIDENCE HISTORY. PHONE HANDED OVER TO THE PT.

## 2020-02-14 NOTE — NUR
RECEIVED FROM AM RN IN BED SLEEPING. CALL LIGHT WITH IN REACH. NO IVF SITE RT PT. REFUSED TO 
HAVE ONE PER AM RN AND THAT MD AWARE. PER AM RN PT. A/O X 4. ABLE TO VERBALIZE NEEDS WELL 
TOO. FOR D/C PLANNING TO SNF TOMORROW FOR CONTINUITY OF CARE .

## 2020-02-14 NOTE — NUR
AM MEDS ADMINISTERED, PT TOLERATED WELL. PT REFUSED THE AZELASTINE NOSE SPRAY, SAYS IT 
"STUFFED HIM UP" AFTER HE TOOK IT LAST TIME.

## 2020-02-14 NOTE — NUR
GOT A CALL FROM LAB ASKING TO CANCEL PT'S LAB DRAW ORDERS FOR THIS MORNING, SINCE THE PT 
REFUSED THE DRAWS. DR DUVAL MADE AWARE.

## 2020-02-14 NOTE — NUR
PT IS LYING IN BED, COMFORTABLE, NO S/S OF DISTRESS. PT KEEPS ASKING FOR SANDWICHES. PT HAD 
A SANDWICH TODAY AFTER BREAKFAST AND ASKING FOR ONE AGAIN. PT MADE AWARE THAT LUNCH IS 
COMING IN LESS THAN AN HOUR.

## 2020-02-15 VITALS — SYSTOLIC BLOOD PRESSURE: 127 MMHG | DIASTOLIC BLOOD PRESSURE: 69 MMHG

## 2020-02-15 VITALS — DIASTOLIC BLOOD PRESSURE: 65 MMHG | SYSTOLIC BLOOD PRESSURE: 127 MMHG

## 2020-02-15 VITALS — SYSTOLIC BLOOD PRESSURE: 127 MMHG | DIASTOLIC BLOOD PRESSURE: 64 MMHG

## 2020-02-15 VITALS — SYSTOLIC BLOOD PRESSURE: 124 MMHG | DIASTOLIC BLOOD PRESSURE: 82 MMHG

## 2020-02-15 RX ADMIN — DIVALPROEX SODIUM SCH MG: 500 TABLET, DELAYED RELEASE ORAL at 20:50

## 2020-02-15 RX ADMIN — DIVALPROEX SODIUM SCH MG: 500 TABLET, DELAYED RELEASE ORAL at 08:39

## 2020-02-15 RX ADMIN — MUPIROCIN SCH GM: 2 CREAM TOPICAL at 08:38

## 2020-02-15 RX ADMIN — IPRATROPIUM BROMIDE AND ALBUTEROL SULFATE SCH ML: .5; 3 SOLUTION RESPIRATORY (INHALATION) at 07:54

## 2020-02-15 RX ADMIN — IPRATROPIUM BROMIDE AND ALBUTEROL SULFATE SCH ML: .5; 3 SOLUTION RESPIRATORY (INHALATION) at 20:44

## 2020-02-15 RX ADMIN — NICOTINE SCH MG: 14 PATCH, EXTENDED RELEASE TOPICAL at 08:39

## 2020-02-15 RX ADMIN — TAMSULOSIN HYDROCHLORIDE SCH MG: 0.4 CAPSULE ORAL at 08:39

## 2020-02-15 RX ADMIN — SODIUM CHLORIDE SCH MLS/HR: 9 INJECTION, SOLUTION INTRAVENOUS at 09:33

## 2020-02-15 RX ADMIN — CHLORHEXIDINE GLUCONATE SCH EA: 2 SOLUTION TOPICAL at 08:40

## 2020-02-15 NOTE — NUR
Note:



I faxed [facility]. Patient does not have snf preference. Rebecca Rivas [facility] [phone] will review inquiry and inform 
patient's nurse if they can accept patient and provide RN with room number and accepting MD. 
Rebecca stated if they can accept patient, she will arrange transportation for patient and pay 
transportation cost.

## 2020-02-15 NOTE — NUR
PT REFUSED BREATHING TX. PT SAID THAT HE IS JUST TIRED. PT IS ON ROOM AIR RESTING 
COMFORTABLE. NO RESPIRATORY DISTRESS NOTED. WILL CONT TO MONITOR.

## 2020-02-15 NOTE — NUR
RECEIVED FROM AM RN IN BED AWAKE AND ALERT. NO SOB. DENIES ANY PAIN. PT. A/O X 4. ABLE TO 
USE CALL LIGHT FOR HELP. NO NOTED S/S OF DYSTONIA. ABLE TO MOVE ALL EXTREMITIES. ASKING AT 
THIS TIME IF HE HAS SOMEWHERE TO GO AFTER HERE AND THAT HE IS HOMELESS. INFORMED HIM THAT 
 IS FINDING A PLACE FOR HIM . "OK"

## 2020-02-15 NOTE — NUR
PATIENT IS AWARE THAT [facility] MIGHT BE AVAILABLE FOR TRANSFER. WILL FOLLOW UP WITH 
[facility]

## 2020-02-15 NOTE — NUR
RECEIVED PATIENT FROM NIGHT SHIFT NURSE FOR CONTINUITY OF CARE. AAOX4. ENGLISH SPEAKING. NO 
SIGNS OF DISTRESS NOTED. RESPIRATIONS EVEN AND UNLABORED, ROOM AIR. VISIBLE CHEST RISE 
NOTED. SKIN WARM, DRY, INTACT. NO IV. BED IN LOW POSITION. CALL LIGHT IS WITHIN REACH. 
SAFETY MEASURES IN PLACE. PATIENT IS IN CONTACT PRECAUTION FOR + MRSA NARES. WILL CONTINUE 
TO MONITOR

## 2020-02-15 NOTE — NUR
GIVEN MORNING MEDICATIONS PO. BACTROBAN IN BOTH NARES. NICOTINE PATCH IN THE LEFT UPPER ARM. 
REMOVED PREVIOUS PATCH FROM YESTERDAY MORNING. GIVEN CHG BATH. EXPLAINED TO PATIENT MEDS AND 
SIDE EFFECTS. PATIENT VERBALIZED UNDERSTANDING. BED IN LOW POSITION. CALL LIGHT IS WITHIN 
REACH. WILL CONTINUE TO MONITOR

## 2020-02-15 NOTE — NUR
ALL P.O. MEDICATIONS TAKEN AND NO COMPLAINTS DONE. PROVIDED WITH BRIAN SO HE WILL TAKE ALL 
MEDICATIONS AS REQUESTED. VERBALIZES NEEDS WELL IN ENGLISH. ISOLATION PRECAUTIONS RT WITH 
MRSA NARES.

## 2020-02-15 NOTE — NUR
MADE AWARE TO THE PATIENT THAT [facility] NOT AVAIL[facility]. DISCUSSED TO THE PATIENT THAT 
[facility] IS LOOKING FOR A DIFFERENT PLACE

## 2020-02-15 NOTE — NUR
PATIENT IS SLEEPING AT THIS TIME. NO SIGNS OF DISTRESS NOTED.BED IN LOW POSITION. CALL LIGHT 
IS WITHIN REACH. WILL CONTINUE TO MONITOR

## 2020-02-15 NOTE — NUR
REFUSED VITAL SIGNS BUT REQUESTED FOR JELLO. PROVIDED WITH SNACK. NO COMPLAINTS DONE. ABLE 
TO VERBALIZE NEEDS WELL.

## 2020-02-15 NOTE — NUR
CONTACTED [facility] (TEL# [phone]), STATED SHE WILL CALL BACK WITH 
THE ROOM AND BED NUMBER. AWAITING FOR CALL BACK. -RN ASSIGNED MADE AWARE.

## 2020-02-15 NOTE — NUR
ENDORSED PATIENT TO THE NIGHT SHIFT NURSE FOR CONTINUITY OF CARE. PATIENT IS AWAKE. NO SIGNS 
OF DISTRESS NOTED. RESPIRATIONS EVEN AND UNLABORED, ROOM AIR.

## 2020-02-15 NOTE — NUR
SLEEPING AT THIS TIME. NO RESTLESSNESS. ABLE TO USE URINAL . CALL LIGHT WITH IN REACH. 
ISOLATION PRECAUTION RT WITH MRSA NARES.

## 2020-02-15 NOTE — NUR
GOT A CALL FROM BAKARI, THE ADMITTING DIRECTOR AT [facility] ([phone]). SHE TOLD ME 
THAT THE ONLY AVAILABLE BED THERE IS OCCUPIED BY AN ISOLATION PATIENT + FOR C-DIFF. ELIJAH, 
AND CHARGE NURSE, AWARE.

## 2020-02-15 NOTE — NUR
GIVEN ROBITUSSIN FOR COUGH. EXPLAINED TO PATIENT MED AND SIDE EFFECT. PATIENT VERBALIZED 
UNDERSTANDING. BED IN LOW POSITION. CALL LIGHT IS WITHIN REACH. WILL CONTINUE TO MONITOR

## 2020-02-15 NOTE — NUR
PATIENT IS WATCHING TV AT THIS TIME. NO SIGNS OF DISTRESS NOTED. BED IN LOW POSITION. CALL 
LIGHT IS WITHIN REACH. WILL CONTINUE TO MONITOR

## 2020-02-15 NOTE — NUR
PATIENT IS EATING DINNER AT THIS TIME. NO SIGNS OF DISTRESS NOTED. BED IN LOW POSITION. CALL 
LIGHT IS WITHIN REACH. WILL CONTINUE TO MONITOR

## 2020-02-15 NOTE — NUR
AWAKE AT THIS TIME AND SITTING UP IN BED. PROVIDED WITH COFFEE AND CHICKEN SANDWICH AS PER 
REQUEST."TOO HUNGRY". ABLE TO VERBALIZE NEEDS WELL. ABLE TO USE CALL LIGHT WELL . NO PAIN 
COMPLAINTS AND NO MUSCLE TREMORS OR SEIZURES COMPLAINT DONE THIS SHIFT.

## 2020-02-16 VITALS — SYSTOLIC BLOOD PRESSURE: 139 MMHG | DIASTOLIC BLOOD PRESSURE: 81 MMHG

## 2020-02-16 RX ADMIN — NICOTINE SCH MG: 14 PATCH, EXTENDED RELEASE TOPICAL at 08:30

## 2020-02-16 RX ADMIN — DIVALPROEX SODIUM SCH MG: 500 TABLET, DELAYED RELEASE ORAL at 21:26

## 2020-02-16 RX ADMIN — CHLORHEXIDINE GLUCONATE SCH EA: 2 SOLUTION TOPICAL at 08:28

## 2020-02-16 RX ADMIN — TAMSULOSIN HYDROCHLORIDE SCH MG: 0.4 CAPSULE ORAL at 08:29

## 2020-02-16 RX ADMIN — IPRATROPIUM BROMIDE AND ALBUTEROL SULFATE SCH ML: .5; 3 SOLUTION RESPIRATORY (INHALATION) at 07:16

## 2020-02-16 RX ADMIN — MUPIROCIN SCH GM: 2 CREAM TOPICAL at 08:29

## 2020-02-16 RX ADMIN — IPRATROPIUM BROMIDE AND ALBUTEROL SULFATE SCH ML: .5; 3 SOLUTION RESPIRATORY (INHALATION) at 21:00

## 2020-02-16 RX ADMIN — SODIUM CHLORIDE SCH MLS/HR: 9 INJECTION, SOLUTION INTRAVENOUS at 18:53

## 2020-02-16 RX ADMIN — DIVALPROEX SODIUM SCH MG: 500 TABLET, DELAYED RELEASE ORAL at 08:29

## 2020-02-16 RX ADMIN — SODIUM CHLORIDE SCH MLS/HR: 9 INJECTION, SOLUTION INTRAVENOUS at 00:45

## 2020-02-16 NOTE — NUR
CALLED [facility] TO CONFIRM IF THEY RECEIVE THE DISCHARGE SUMMARY. GRICEL SAID YES AND THE 
PATIENT IS GOING TO BE TRANSFERRED AT 2030

## 2020-02-16 NOTE — NUR
per Kathryn Abbott ,JAH pt [facility] going to [address] and they will provide 
transportation.

## 2020-02-16 NOTE — NUR
PATIENT IS AWARE THAT HE WILL BE TRANSFERRED TO [facility] IN [place]. AWAITING FOR 
TRANSPORT. WILL LET THE PT KNOW ONCE TRANSPORT IS ARRANGED.

## 2020-02-16 NOTE — NUR
GIVEN MORNING MEDICATIONS PO. EXPLAINED TO PATIENT MEDS AND SIDE EFFECTS. PATIENT VERBALIZED 
UNDERSTANDING. BED IN LOW POSITION. WILL CONTINUE TO MONITOR.

## 2020-02-16 NOTE — NUR
PT. SEEN STANDING BY THE DOORWAY WATCHING PEOPLE PASS BY. ENCOURAGED TO STAY INSIDE ROOM AND 
GO TO SLEEP. "I AM BORED" CALL LIGHT WITH IN REACH.

## 2020-02-16 NOTE — NUR
GOT A CALL FROM [facility] [employer], THAT [facility] WILL PROVIDE TRANSPORT FOR 
PATIENT. WILL LET PATIENT KNOW

## 2020-02-16 NOTE — NUR
RECEIVED PATIENT FROM NIGHT SHIFT NURSE FOR CONTINUITY OF CARE. AAOX4. ENGLISH SPEAKING. NO 
SIGNS OF DISTRESS NOTED. RESPIRATIONS EVEN AND UNLABORED, ROOM AIR. PATIENT IS GETTING 
BREATHING TX AT THIS TIME. VISIBLE CHEST RISE NOTED. SKIN WARM, DRY, INTACT. NO IV. BED IN 
LOW POSITION. CALL LIGHT IS WITHIN REACH. SAFETY MEASURES IN PLACE. PATIENT IS IN CONTACT 
PRECAUTION FOR + MRSA NARES. WILL CONTINUE TO MONITOR

## 2020-02-16 NOTE — NUR
RECD. SLEEPING COMFORTABLY IN BED, RESPIRATION EVEN AND UNLABORED. SAFETY MEASURES ENFORCED. 
BED IN THE LOWEST POSITION, SIDE RAILS UP, CALL LIGHT IN REACH. ON BILATERAL LEG 
SEQUENTIALS. DENIES PAIN 0/10.

## 2020-02-16 NOTE — NUR
PATIENT SIGNED DISCHARGED PAPER. EXPLAINED THAT HE WILL BE TRANSFERRED TO [facility] FOR 
PT. PATIENT VERBALIZED UNDERSTANDING.

## 2020-02-16 NOTE — NUR
RECEIVED CALL FROM [facility], THEY WERE NOT ABLE TO SET UP TRANSPORTATION TONIGHT AND THEY 
WILL COME,  PT ON TOMORROW MORNING. MADE AWARE OF DR. WHITE, AND CHARGE NURSE, KIERSTEN.

## 2020-02-16 NOTE — NUR
PATIENT IS ASLEEP. NO SIGNS OF DISTRESS NOTED. BED IN LOW POSITION. CALL LIGHT IS WITHIN 
REACH. WILL CONTINUE TO MONITOR

## 2020-02-16 NOTE — NUR
GAVE REPORT TO BRENNA NUÑEZ AT [facility]. SHE IS AWARE THAT  TIME IS 2030 TONIGHT. 
SHE IS ALSO AWARE THAT THE PATIENT IS HOMELESS, REFUSED FLU AND PNA VACCINES, REFUSED VITAL 
SIGNS, SKIN INTACT. NO IV. AMBULATORY, AND + MRSA NARES.

## 2020-02-16 NOTE — NUR
AS PER RAN TABOR, PATIENT'S HOME MEDS WILL BE SENT TO [facility] TOMORROW 2/17/20 
BECAUSE PHARMACY IS CLOSED ALREADY

## 2020-02-16 NOTE — NUR
ENDORSED PATIENT TO THE NIGHT NURSE FOR CONTINUITY OF CARE. PATIENT IS SLEEPING AT THIS 
TIME. NO SIGNS OF DISTRESS NOTED. BED IN LOW POSITION.

## 2020-02-16 NOTE — NUR
INFORMED PT ABOUT DELAYED TRANSFER D/T TRANSPORTATION. GIVEN BENADRYL, SEROQUEL, AND 
DEPAKOTE AS MD ORDERED, PT TOLERATED WELL.

## 2020-02-16 NOTE — NUR
RECEIVED PT FROM AM SHIFT. PT IN NO APPARENT RESPIRATORY DISTRESS AT THIS TIME; HR 70, RR 
18, SPO2 100% ON ROOM AIR, AND A CLEAR BREATH SOUNDS. PT REFUSED HHN TX. WILL CONTINUE TO 
MONITOR PT.

## 2020-02-16 NOTE — NUR
RECEIVED BEDSIDE REPORT FROM DAY SHIFT NURSE. PT IN BED, RESTING. NO SOB OR ANY 
RESPIRATORY DISTRESS NOTED, BREATHING EVEN AND UNLABORED WITH ROOM AIR. SKIN INTACT, WARM 
AND DRY TO TOUCH. PT WAITING FOR TX. PT IN STABLE CONDITION.

## 2020-02-17 NOTE — NUR
FOUND INSIDE HIS ROOM ALREADY DRESSED UP READY TO GO HOME. EXPLAINED THAT THE AMBULANCE THAT 
WILL TAKE HIM TO [facility] IS NOT YET HERE. VERY UPSET, WANTS TO GO HOME, WANTS TO SIGN 
AMA. INFORMED DR. WHALEY. PATIENT STATED  "I CANNOT WAIT FOR THAT ANYMORE". EXPLAINED HE 
NEEDS TO WAIT FOR THE PHARMACY BECAUSE HIS HOME MEDICATIONS IS IN THE PHARMACY.

## 2020-02-17 NOTE — NUR
PATIENT GETTING UNCONTROLLABLE, SECURITY CAME. AGREED TO WAIT FOR PHARMACY SO THAT HE CAN 
TAKE HOME HIS HOME MEDICATIONS.

## 2020-02-17 NOTE — NUR
PATIENT SIGNED AMA PAPER EVEN ENOUGH EXPLANATION WAS GIVEN THAT IT IS BETTER FOR HIM TO BE 
TRANSFERRED TO [facility] FOR CONTINUITY OF CARE. SECURITY HEAD OFFICER THANIA BRING  ALL 
HOME MEDS OF PATIENT FROM PHARMACY AND WAS GIVEN TO PATIENT. WHEN INQUIRED WHERE HE IS 
GOING, STATED HE WILL FIGURE IT OUT. AMBULATED TO HALLWAY WITH SECURITY TO GO OUT OF THE 
HOSPITAL AGAINST MEDICAL ADVICE.

## 2020-02-21 ENCOUNTER — HOSPITAL ENCOUNTER (EMERGENCY)
Dept: HOSPITAL 26 - MED | Age: 55
Discharge: HOME | End: 2020-02-21
Payer: COMMERCIAL

## 2020-02-21 VITALS — DIASTOLIC BLOOD PRESSURE: 70 MMHG | SYSTOLIC BLOOD PRESSURE: 129 MMHG

## 2020-02-21 VITALS — SYSTOLIC BLOOD PRESSURE: 122 MMHG | DIASTOLIC BLOOD PRESSURE: 78 MMHG

## 2020-02-21 VITALS — HEIGHT: 75 IN | BODY MASS INDEX: 23 KG/M2 | WEIGHT: 185 LBS

## 2020-02-21 DIAGNOSIS — F31.9: ICD-10-CM

## 2020-02-21 DIAGNOSIS — Z59.0: ICD-10-CM

## 2020-02-21 DIAGNOSIS — I10: ICD-10-CM

## 2020-02-21 DIAGNOSIS — F20.9: ICD-10-CM

## 2020-02-21 DIAGNOSIS — M25.562: Primary | ICD-10-CM

## 2020-02-21 DIAGNOSIS — M25.561: ICD-10-CM

## 2020-02-21 DIAGNOSIS — Z79.899: ICD-10-CM

## 2020-02-21 DIAGNOSIS — Z88.4: ICD-10-CM

## 2020-02-21 LAB
ALBUMIN FLD-MCNC: 3.7 G/DL (ref 3.4–5)
ANION GAP SERPL CALCULATED.3IONS-SCNC: 13 MMOL/L (ref 8–16)
APAP SERPL-MCNC: < 0.5 UG/ML (ref 10–30)
APPEARANCE UR: CLEAR
AST SERPL-CCNC: 46 U/L (ref 15–37)
BARBITURATES UR QL SCN: (no result) NG/ML
BASOPHILS # BLD AUTO: 0.1 K/UL (ref 0–0.22)
BASOPHILS NFR BLD AUTO: 1.1 % (ref 0–2)
BENZODIAZ UR QL SCN: (no result) NG/ML
BILIRUB SERPL-MCNC: 0.5 MG/DL (ref 0–1)
BILIRUB UR QL STRIP: NEGATIVE
BUN SERPL-MCNC: 16 MG/DL (ref 7–18)
BZE UR QL SCN: (no result) NG/ML
CANNABINOIDS UR QL SCN: (no result) NG/ML
CHLORIDE SERPL-SCNC: 100 MMOL/L (ref 98–107)
CO2 SERPL-SCNC: 29.7 MMOL/L (ref 21–32)
COLOR UR: YELLOW
CREAT SERPL-MCNC: 0.9 MG/DL (ref 0.6–1.3)
EOSINOPHIL # BLD AUTO: 0.2 K/UL (ref 0–0.4)
EOSINOPHIL NFR BLD AUTO: 2 % (ref 0–4)
ERYTHROCYTE [DISTWIDTH] IN BLOOD BY AUTOMATED COUNT: 14 % (ref 11.6–13.7)
GFR SERPL CREATININE-BSD FRML MDRD: 113 ML/MIN (ref 90–?)
GLUCOSE SERPL-MCNC: 81 MG/DL (ref 74–106)
GLUCOSE UR STRIP-MCNC: NEGATIVE MG/DL
HCT VFR BLD AUTO: 42.4 % (ref 36–52)
HGB BLD-MCNC: 14.3 G/DL (ref 12–18)
HGB UR QL STRIP: NEGATIVE
LEUKOCYTE ESTERASE UR QL STRIP: NEGATIVE
LYMPHOCYTES # BLD AUTO: 1.3 K/UL (ref 2–11.5)
LYMPHOCYTES NFR BLD AUTO: 15.7 % (ref 20.5–51.1)
MCH RBC QN AUTO: 28 PG (ref 27–31)
MCHC RBC AUTO-ENTMCNC: 34 G/DL (ref 33–37)
MCV RBC AUTO: 83.8 FL (ref 80–94)
MONOCYTES # BLD AUTO: 0.9 K/UL (ref 0.8–1)
MONOCYTES NFR BLD AUTO: 10.1 % (ref 1.7–9.3)
NEUTROPHILS # BLD AUTO: 6.1 K/UL (ref 1.8–7.7)
NEUTROPHILS NFR BLD AUTO: 71.1 % (ref 42.2–75.2)
NITRITE UR QL STRIP: NEGATIVE
OPIATES UR QL SCN: (no result) NG/ML
PCP UR QL SCN: (no result) NG/ML
PH UR STRIP: 6 [PH] (ref 5–9)
PLATELET # BLD AUTO: 293 K/UL (ref 140–450)
POTASSIUM SERPL-SCNC: 4.7 MMOL/L (ref 3.5–5.1)
RBC # BLD AUTO: 5.06 MIL/UL (ref 4.2–6.1)
SALICYLATES SERPL-MCNC: < 2.8 MG/DL (ref 2.8–20)
SODIUM SERPL-SCNC: 138 MMOL/L (ref 136–145)
WBC # BLD AUTO: 8.6 K/UL (ref 4.8–10.8)

## 2020-02-21 PROCEDURE — 85025 COMPLETE CBC W/AUTO DIFF WBC: CPT

## 2020-02-21 PROCEDURE — 81003 URINALYSIS AUTO W/O SCOPE: CPT

## 2020-02-21 PROCEDURE — G0480 DRUG TEST DEF 1-7 CLASSES: HCPCS

## 2020-02-21 PROCEDURE — 93005 ELECTROCARDIOGRAM TRACING: CPT

## 2020-02-21 PROCEDURE — 99284 EMERGENCY DEPT VISIT MOD MDM: CPT

## 2020-02-21 PROCEDURE — 80305 DRUG TEST PRSMV DIR OPT OBS: CPT

## 2020-02-21 PROCEDURE — 36415 COLL VENOUS BLD VENIPUNCTURE: CPT

## 2020-02-21 PROCEDURE — G0482 DRUG TEST DEF 15-21 CLASSES: HCPCS

## 2020-02-21 PROCEDURE — 80053 COMPREHEN METABOLIC PANEL: CPT

## 2020-02-21 SDOH — ECONOMIC STABILITY - HOUSING INSECURITY: HOMELESSNESS: Z59.0

## 2020-02-21 NOTE — NUR
PT GAVE SONS PHONE NUMBER [phone], ATTEMPTED TO CALL TWICE, NO ANSWER AND 
VOICE MALL BOX IS FULL AND MESSAGE CAN NOT BE LEFT

## 2020-02-21 NOTE — NUR
AT THIS TIME, PT STATES HE WAS RECENTLY RELEASED FROM [facility] AND TRIED TO GET IN 
CONTACT WITH HIS SON BUT WAS UNABLE TO. PT STATES HE IS HOMELESS AND NEEDS A 
PLACE TO STAY. PT STATES HE HAS COURT CASES AND HE NEEDS A PLACE OF RESIDENCE 
FOR THE COURT. PT STATES HE IS NOT SUICIDAL AT THIS TIME BUT WANTS A PLACE TO 
STAY. PT STATES HE WISHES STAFF TO FIND A SHELTER TO ACCEPT PATIENT.

UPON PRESENTATION, PT IS FULLY CLOTHED, ALERT AND ORIENTED, SPEAKING IN CLEAR 
SENTENCES.

## 2020-02-21 NOTE — NUR
AFTER SPEAKING WITH , PT AGREES TO TAKE RECOMMENDED RESOURCES AND 
BUS TICKET TO FIND HIS OWN PLACEMENT.

PT DRESSED APPROPRIATELY, PROVIDED WITH MEAL AND BUS PASS. PT SIGNED HOMELESS 
WAIVER.

Patient discharged with v/s stable. Written and verbal after care instructions 
given and explained REGARDING KNEE PAIN. 

Patient alert, oriented and verbalized understanding of instructions. 
Ambulatory with steady gait. All questions addressed prior to discharge. ID 
band removed. Opportunity to ask questions provided and answered.

PT DENIES SI UPON DISCHARGE.

## 2020-02-21 NOTE — NUR
Note:



SW met with patient at bedside by request of House Supervisor Myrna. When SW met with 
patient, patient disclosed that he was previously arrested and had walked from [facility] until he needed to contact the ambulance that had taken him to [facility]. Patient reported 
that he was arrested for loitering in front of his son's house because he did not have his 
son's contact information. Patient stated that he currently has no money but typically 
receives SSI ~$800 a month. SW provided homeless resources and room and board resources. 
Patient began coordinating his living arrangements and called his son to pick him up. 
Patient stated that his son did not answer. Patient requested a bus pass to his son's house. 
SW asked patient if he had weather appropriate clothing. Patient stated yes. Patient was 
given a lunch to go and a bus pass. Patient verbalized appreciation. No further needs 
identified.

## 2021-10-17 ENCOUNTER — HOSPITAL ENCOUNTER (INPATIENT)
Dept: HOSPITAL 12 - ER | Age: 56
LOS: 4 days | Discharge: HOME | DRG: 885 | End: 2021-10-21
Payer: COMMERCIAL

## 2021-10-17 VITALS — HEIGHT: 75 IN | WEIGHT: 189 LBS | BODY MASS INDEX: 23.5 KG/M2

## 2021-10-17 VITALS — SYSTOLIC BLOOD PRESSURE: 115 MMHG | DIASTOLIC BLOOD PRESSURE: 71 MMHG

## 2021-10-17 DIAGNOSIS — I10: ICD-10-CM

## 2021-10-17 DIAGNOSIS — Z20.822: ICD-10-CM

## 2021-10-17 DIAGNOSIS — F12.10: ICD-10-CM

## 2021-10-17 DIAGNOSIS — F32.9: ICD-10-CM

## 2021-10-17 DIAGNOSIS — F25.0: Primary | ICD-10-CM

## 2021-10-17 DIAGNOSIS — M79.671: ICD-10-CM

## 2021-10-17 DIAGNOSIS — Z87.891: ICD-10-CM

## 2021-10-17 DIAGNOSIS — E78.5: ICD-10-CM

## 2021-10-17 DIAGNOSIS — M79.672: ICD-10-CM
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
## 2021-10-17 DIAGNOSIS — Z59.00: ICD-10-CM

## 2021-10-17 DIAGNOSIS — F10.10: ICD-10-CM

## 2021-10-17 DIAGNOSIS — E11.9: ICD-10-CM

## 2021-10-17 DIAGNOSIS — F15.10: ICD-10-CM

## 2021-10-17 DIAGNOSIS — F29: ICD-10-CM

## 2021-10-17 LAB
ALP SERPL-CCNC: 65 U/L (ref 50–136)
ALT SERPL W/O P-5'-P-CCNC: 34 U/L (ref 16–63)
AMPHETAMINES UR QL SCN>1000 NG/ML: POSITIVE
APAP SERPL-MCNC: < 2 UG/ML (ref 10–30)
APPEARANCE UR: CLEAR
AST SERPL-CCNC: 28 U/L (ref 15–37)
BILIRUB DIRECT SERPL-MCNC: 0.1 MG/DL (ref 0–0.2)
BILIRUB SERPL-MCNC: 0.3 MG/DL (ref 0.2–1)
BILIRUB UR QL STRIP: NEGATIVE
BUN SERPL-MCNC: 9 MG/DL (ref 7–18)
CHLORIDE SERPL-SCNC: 100 MMOL/L (ref 98–107)
CK SERPL-CCNC: 540 U/L (ref 39–308)
CO2 SERPL-SCNC: 27 MMOL/L (ref 21–32)
COCAINE UR QL SCN: NEGATIVE
COLOR UR: YELLOW
CREAT SERPL-MCNC: 0.9 MG/DL (ref 0.6–1.3)
ETHANOL SERPL-MCNC: < 3 MG/DL (ref 0–0)
GLUCOSE SERPL-MCNC: 113 MG/DL (ref 74–106)
GLUCOSE UR STRIP-MCNC: NEGATIVE MG/DL
HCT VFR BLD AUTO: 43.3 % (ref 36.7–47.1)
HGB UR QL STRIP: NEGATIVE
KETONES UR STRIP-MCNC: NEGATIVE MG/DL
LEUKOCYTE ESTERASE UR QL STRIP: NEGATIVE
MCH RBC QN AUTO: 29.8 UUG (ref 23.8–33.4)
MCV RBC AUTO: 88.3 FL (ref 73–96.2)
NITRITE UR QL STRIP: NEGATIVE
OPIATES UR QL SCN: NEGATIVE
PCP UR QL SCN>25 NG/ML: NEGATIVE
PH UR STRIP: 7 [PH] (ref 5–8)
PLATELET # BLD AUTO: 311 K/UL (ref 152–348)
POTASSIUM SERPL-SCNC: 4.1 MMOL/L (ref 3.5–5.1)
SP GR UR STRIP: 1.02 (ref 1–1.03)
THC UR QL SCN>50 NG/ML: POSITIVE
UROBILINOGEN UR STRIP-MCNC: 0.2 E.U./DL
WS STN SPEC: 3.9 G/DL (ref 6.4–8.2)

## 2021-10-17 PROCEDURE — G0480 DRUG TEST DEF 1-7 CLASSES: HCPCS

## 2021-10-17 RX ADMIN — ACETAMINOPHEN PRN MG: 325 TABLET ORAL at 22:57

## 2021-10-17 SDOH — ECONOMIC STABILITY - HOUSING INSECURITY: HOMELESSNESS UNSPECIFIED: Z59.00

## 2021-10-18 VITALS — DIASTOLIC BLOOD PRESSURE: 70 MMHG | SYSTOLIC BLOOD PRESSURE: 106 MMHG

## 2021-10-18 VITALS — DIASTOLIC BLOOD PRESSURE: 82 MMHG | SYSTOLIC BLOOD PRESSURE: 121 MMHG

## 2021-10-18 LAB
CHOLEST SERPL-MCNC: 195 MG/DL (ref ?–200)
GLUCOSE SERPL-MCNC: 90 MG/DL (ref 70–115)
HDLC SERPL-MCNC: 48 MG/DL (ref 40–60)
TRIGL SERPL-MCNC: 120 MG/DL (ref 30–150)

## 2021-10-18 RX ADMIN — ACETAMINOPHEN PRN MG: 325 TABLET ORAL at 20:51

## 2021-10-18 RX ADMIN — ARIPIPRAZOLE SCH MG: 5 TABLET ORAL at 13:20

## 2021-10-18 RX ADMIN — DIVALPROEX SODIUM SCH MG: 250 TABLET, DELAYED RELEASE ORAL at 18:40

## 2021-10-18 RX ADMIN — ATORVASTATIN CALCIUM SCH MG: 20 TABLET, FILM COATED ORAL at 20:51

## 2021-10-19 VITALS — DIASTOLIC BLOOD PRESSURE: 88 MMHG | SYSTOLIC BLOOD PRESSURE: 151 MMHG

## 2021-10-19 VITALS — SYSTOLIC BLOOD PRESSURE: 115 MMHG | DIASTOLIC BLOOD PRESSURE: 76 MMHG

## 2021-10-19 VITALS — DIASTOLIC BLOOD PRESSURE: 78 MMHG | SYSTOLIC BLOOD PRESSURE: 116 MMHG

## 2021-10-19 RX ADMIN — ATORVASTATIN CALCIUM SCH MG: 20 TABLET, FILM COATED ORAL at 20:34

## 2021-10-19 RX ADMIN — ARIPIPRAZOLE SCH MG: 5 TABLET ORAL at 09:16

## 2021-10-19 RX ADMIN — DIVALPROEX SODIUM SCH MG: 250 TABLET, DELAYED RELEASE ORAL at 16:39

## 2021-10-19 RX ADMIN — DIVALPROEX SODIUM SCH MG: 250 TABLET, DELAYED RELEASE ORAL at 09:16

## 2021-10-20 VITALS — DIASTOLIC BLOOD PRESSURE: 59 MMHG | SYSTOLIC BLOOD PRESSURE: 112 MMHG

## 2021-10-20 VITALS — SYSTOLIC BLOOD PRESSURE: 123 MMHG | DIASTOLIC BLOOD PRESSURE: 84 MMHG

## 2021-10-20 VITALS — DIASTOLIC BLOOD PRESSURE: 61 MMHG | SYSTOLIC BLOOD PRESSURE: 98 MMHG

## 2021-10-20 RX ADMIN — ATORVASTATIN CALCIUM SCH MG: 20 TABLET, FILM COATED ORAL at 20:53

## 2021-10-20 RX ADMIN — DIVALPROEX SODIUM SCH MG: 250 TABLET, DELAYED RELEASE ORAL at 17:12

## 2021-10-20 RX ADMIN — ARIPIPRAZOLE SCH MG: 5 TABLET ORAL at 09:23

## 2021-10-20 RX ADMIN — DIVALPROEX SODIUM SCH MG: 250 TABLET, DELAYED RELEASE ORAL at 09:23

## 2021-10-21 VITALS — SYSTOLIC BLOOD PRESSURE: 130 MMHG | DIASTOLIC BLOOD PRESSURE: 95 MMHG

## 2021-10-21 RX ADMIN — ARIPIPRAZOLE SCH MG: 5 TABLET ORAL at 08:22

## 2021-10-21 RX ADMIN — DIVALPROEX SODIUM SCH MG: 250 TABLET, DELAYED RELEASE ORAL at 08:22

## 2021-10-29 ENCOUNTER — HOSPITAL ENCOUNTER (EMERGENCY)
Dept: HOSPITAL 26 - MED | Age: 56
Discharge: HOME | End: 2021-10-29
Payer: MEDICARE

## 2021-10-29 VITALS — HEIGHT: 75 IN | BODY MASS INDEX: 22.38 KG/M2 | WEIGHT: 180 LBS

## 2021-10-29 VITALS — DIASTOLIC BLOOD PRESSURE: 74 MMHG | SYSTOLIC BLOOD PRESSURE: 124 MMHG
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
## 2021-10-29 DIAGNOSIS — M79.672: ICD-10-CM

## 2021-10-29 DIAGNOSIS — M79.671: Primary | ICD-10-CM

## 2021-10-29 NOTE — NUR
57 Y/O MALE BIBA C/O BILAT LEG PAIN. PER EMS [facility] [address] [facility] [place]. DENIES 
ANY RECENT INJURY OR TRAUMA. PT STATES 8/10 BURNING PAIN. SWELLING NOTED TO 
BILAT LEGS. NO OPEN WOUNDS NOTED. 



MEDHX: DM, HTN 

ALLERIGES: LATUDA, ABILIFY, ZYPREXA

## 2021-10-29 NOTE — NUR
Patient discharged with v/s stable. Written and verbal after care instructions 
given and explained. 

Patient alert, oriented and verbalized understanding of instructions. 
Ambulatory with steady gait. All questions addressed prior to discharge. ID 
band removed. Patient advised to follow up with PMD. Rx of IBUPROFEN AND 
DIABETIC SHOES given. ALSO WAS PROVIDED WITH HOMELESS NUTRITION PACK.Patient 
educated on indication of medication including possible reaction and side 
effects. Opportunity to ask questions provided and answered.